# Patient Record
Sex: MALE | URBAN - METROPOLITAN AREA
[De-identification: names, ages, dates, MRNs, and addresses within clinical notes are randomized per-mention and may not be internally consistent; named-entity substitution may affect disease eponyms.]

---

## 2022-09-22 ENCOUNTER — OFFICE VISIT (OUTPATIENT)
Dept: URBAN - METROPOLITAN AREA CLINIC 17 | Facility: CLINIC | Age: 87
End: 2022-09-22
Payer: COMMERCIAL

## 2022-09-22 DIAGNOSIS — H40.013 OPEN ANGLE WITH BORDERLINE FINDINGS, LOW RISK, BILATERAL: ICD-10-CM

## 2022-09-22 DIAGNOSIS — H04.123 DRY EYE SYNDROME OF BILATERAL LACRIMAL GLANDS: ICD-10-CM

## 2022-09-22 DIAGNOSIS — Z96.1 PRESENCE OF PSEUDOPHAKIA: ICD-10-CM

## 2022-09-22 DIAGNOSIS — E11.9 TYPE 2 DIABETES MELLITUS W/O COMPLICATION: Primary | ICD-10-CM

## 2022-09-22 PROCEDURE — 92004 COMPRE OPH EXAM NEW PT 1/>: CPT | Performed by: OPTOMETRIST

## 2022-09-22 ASSESSMENT — INTRAOCULAR PRESSURE
OS: 11
OD: 12
OS: 9
OD: 7

## 2022-09-22 ASSESSMENT — VISUAL ACUITY: OD: 20/40

## 2022-09-22 NOTE — IMPRESSION/PLAN
Impression: Type 2 diabetes mellitus w/o complication: Y47.8. Plan: No diabetic retinopathy today. Discussed importance of closely monitoring and controlling glucose to minimize risks of progression of disease. Patient instructed to notify clinic immediately if changes to vision are noted.

## 2022-09-22 NOTE — IMPRESSION/PLAN
Impression: Presence of pseudophakia: Z96.1 Bilateral. Plan: Patient advised to continue to use drops as instructed and to not rub eye. Patient was also advised to keep appointment in one week for follow up. Patient to call office with any problems.